# Patient Record
Sex: FEMALE | Race: WHITE | NOT HISPANIC OR LATINO | Employment: UNEMPLOYED | URBAN - METROPOLITAN AREA
[De-identification: names, ages, dates, MRNs, and addresses within clinical notes are randomized per-mention and may not be internally consistent; named-entity substitution may affect disease eponyms.]

---

## 2022-06-13 ENCOUNTER — HOSPITAL ENCOUNTER (EMERGENCY)
Facility: HOSPITAL | Age: 32
Discharge: HOME | End: 2022-06-13
Attending: EMERGENCY MEDICINE
Payer: MEDICAID

## 2022-06-13 VITALS
TEMPERATURE: 99 F | HEART RATE: 92 BPM | OXYGEN SATURATION: 98 % | SYSTOLIC BLOOD PRESSURE: 126 MMHG | WEIGHT: 120 LBS | DIASTOLIC BLOOD PRESSURE: 87 MMHG | RESPIRATION RATE: 20 BRPM | BODY MASS INDEX: 22.08 KG/M2 | HEIGHT: 62 IN

## 2022-06-13 DIAGNOSIS — B34.9 ACUTE VIRAL SYNDROME: Primary | ICD-10-CM

## 2022-06-13 LAB
FLUAV AG UPPER RESP QL IA.RAPID: NOT DETECTED
FLUBV AG UPPER RESP QL IA.RAPID: NOT DETECTED
SARS-COV-2 RNA RESP QL NAA+PROBE: NOT DETECTED

## 2022-06-13 PROCEDURE — 87636 SARSCOV2 & INF A&B AMP PRB: CPT | Performed by: EMERGENCY MEDICINE

## 2022-06-13 PROCEDURE — 99282 EMERGENCY DEPT VISIT SF MDM: CPT | Mod: 25

## 2022-06-13 NOTE — Clinical Note
"Aggie Arevalo" Glenroy was seen and treated in our emergency department on 6/13/2022.  She may return to work on 06/15/2022.       If you have any questions or concerns, please don't hesitate to call.      Pratik Porter RN RN    "

## 2022-06-13 NOTE — ED NOTES
Accompanied alone to ED, c/o body aches w/ fever and slight cough x 1 days, motrin taken pta, reports co workers are flu +

## 2022-06-13 NOTE — LETTER
"  [facility] - Emergency Dept  [address]  [facility] [address]  Phone: [phone]   June 13, 2022    Patient: Aggie Tim (Tiffani)   YOB: 1990   Date of Visit: 6/13/2022   Patient ID [id number]       To Whom It May Concern:    Aggie Tim (Tiffani) was seen and treated in our emergency department on 6/13/2022. She {Return to school/sport/work:30110}.      Sincerely,          ,       "

## 2022-06-13 NOTE — ED PROVIDER NOTES
Encounter Date: 6/13/2022       History     Chief Complaint   Patient presents with    bodyaches     Pt c/o bodyaches, fever and chills onset last night.     The history is provided by the patient. No  was used.   Fever  Primary symptoms of the febrile illness include fever, headaches, cough, myalgias and arthralgias. Primary symptoms do not include shortness of breath, nausea, dysuria or rash. This is a new problem.   The maximum temperature recorded prior to her arrival was 100 to 100.9 F.   The headache began today. Headache is a new problem. Location/region(s) of the headache: bilateral. The headache is not associated with weakness.   The cough began yesterday. The cough is non-productive.   Myalgias began yesterday. The myalgias have been unchanged since their onset. The myalgias are not associated with weakness.   States co-worker tested + for influenza    Review of patient's allergies indicates:   Allergen Reactions    Morphine      Other reaction(s): itching    Penicillins      Other reaction(s): unknown     No past medical history on file.  No past surgical history on file.  No family history on file.     Review of Systems   Constitutional: Positive for fever.   HENT: Negative for sore throat.    Respiratory: Positive for cough. Negative for shortness of breath.    Cardiovascular: Negative for chest pain.   Gastrointestinal: Negative for nausea.   Genitourinary: Negative for dysuria.   Musculoskeletal: Positive for arthralgias and myalgias. Negative for back pain.   Skin: Negative for rash.   Neurological: Positive for headaches. Negative for weakness.   Hematological: Does not bruise/bleed easily.   All other systems reviewed and are negative.      Physical Exam     Initial Vitals [06/13/22 0805]   BP Pulse Resp Temp SpO2   126/87 92 20 98.8 °F (37.1 °C) 98 %      MAP       --         Physical Exam    Nursing note and vitals reviewed.  Constitutional: She appears well-developed and  well-nourished.   HENT:   Head: Normocephalic and atraumatic.   Right Ear: External ear normal.   Left Ear: External ear normal.   Eyes: Conjunctivae and EOM are normal. Pupils are equal, round, and reactive to light.   Neck: Neck supple.   Normal range of motion.  Cardiovascular: Normal rate, regular rhythm, normal heart sounds and intact distal pulses.   Pulmonary/Chest: Breath sounds normal.   Abdominal: Abdomen is soft. Bowel sounds are normal.   Musculoskeletal:         General: Normal range of motion.      Cervical back: Normal range of motion and neck supple.     Neurological: She is alert and oriented to person, place, and time. GCS score is 15. GCS eye subscore is 4. GCS verbal subscore is 5. GCS motor subscore is 6.   Skin: Skin is warm and dry. Capillary refill takes less than 2 seconds.   Psychiatric: She has a normal mood and affect. Her behavior is normal. Judgment and thought content normal.         ED Course   Procedures  Labs Reviewed   COVID/FLU A&B PCR - Normal          Imaging Results    None          Medications - No data to display                       Clinical Impression:   Final diagnoses:  [B34.9] Acute viral syndrome (Primary)          ED Disposition Condition    Discharge Stable        ED Prescriptions     None        Follow-up Information     Follow up With Specialties Details Why Contact Info    Follow up with your primary MD in 3-5 days if not improved.  Return to ED for worsening symptoms.               Axel Ogden MD  06/13/22 7810

## 2022-09-25 ENCOUNTER — HOSPITAL ENCOUNTER (EMERGENCY)
Facility: HOSPITAL | Age: 32
Discharge: HOME | End: 2022-09-25
Attending: FAMILY MEDICINE
Payer: MEDICAID

## 2022-09-25 VITALS
OXYGEN SATURATION: 99 % | BODY MASS INDEX: 22.08 KG/M2 | SYSTOLIC BLOOD PRESSURE: 125 MMHG | TEMPERATURE: 98 F | RESPIRATION RATE: 18 BRPM | DIASTOLIC BLOOD PRESSURE: 98 MMHG | HEIGHT: 62 IN | HEART RATE: 94 BPM | WEIGHT: 120 LBS

## 2022-09-25 DIAGNOSIS — J06.9 VIRAL URI WITH COUGH: Primary | ICD-10-CM

## 2022-09-25 LAB
FLUAV AG UPPER RESP QL IA.RAPID: NOT DETECTED
FLUBV AG UPPER RESP QL IA.RAPID: NOT DETECTED
SARS-COV-2 RDRP RESP QL NAA+PROBE: NEGATIVE
SARS-COV-2 RNA RESP QL NAA+PROBE: NOT DETECTED
STREP A PCR (OHS): NOT DETECTED

## 2022-09-25 PROCEDURE — 87635 SARS-COV-2 COVID-19 AMP PRB: CPT | Performed by: INTERNAL MEDICINE

## 2022-09-25 PROCEDURE — 87651 STREP A DNA AMP PROBE: CPT | Performed by: INTERNAL MEDICINE

## 2022-09-25 PROCEDURE — 87631 RESP VIRUS 3-5 TARGETS: CPT | Performed by: INTERNAL MEDICINE

## 2022-09-25 PROCEDURE — 25000003 PHARM REV CODE 250: Performed by: PHYSICIAN ASSISTANT

## 2022-09-25 PROCEDURE — 87636 SARSCOV2 & INF A&B AMP PRB: CPT | Performed by: PHYSICIAN ASSISTANT

## 2022-09-25 PROCEDURE — 99284 EMERGENCY DEPT VISIT MOD MDM: CPT | Mod: 25

## 2022-09-25 RX ORDER — IBUPROFEN 600 MG/1
600 TABLET ORAL
Status: DISCONTINUED | OUTPATIENT
Start: 2022-09-25 | End: 2022-09-25

## 2022-09-25 RX ORDER — BENZONATATE 100 MG/1
100 CAPSULE ORAL 3 TIMES DAILY PRN
Qty: 21 CAPSULE | Refills: 0 | Status: SHIPPED | OUTPATIENT
Start: 2022-09-25 | End: 2022-10-02

## 2022-09-25 RX ORDER — FLUTICASONE PROPIONATE 50 MCG
1 SPRAY, SUSPENSION (ML) NASAL 2 TIMES DAILY PRN
Qty: 15 G | Refills: 0 | Status: SHIPPED | OUTPATIENT
Start: 2022-09-25

## 2022-09-25 RX ORDER — CETIRIZINE HYDROCHLORIDE 10 MG/1
10 TABLET ORAL DAILY
Qty: 30 TABLET | Refills: 0 | Status: SHIPPED | OUTPATIENT
Start: 2022-09-25 | End: 2022-10-25

## 2022-09-25 RX ORDER — IBUPROFEN 600 MG/1
600 TABLET ORAL
Status: COMPLETED | OUTPATIENT
Start: 2022-09-25 | End: 2022-09-25

## 2022-09-25 RX ADMIN — IBUPROFEN 600 MG: 600 TABLET ORAL at 07:09

## 2022-09-25 NOTE — Clinical Note
"Aggie Arevalo" Glenroy was seen and treated in our emergency department on 9/25/2022.  She may return to work on 09/28/2022.       If you have any questions or concerns, please don't hesitate to call.      JORGE Engel"

## 2022-09-26 NOTE — ED PROVIDER NOTES
Encounter Date: 9/25/2022       History     Chief Complaint   Patient presents with    Sore Throat     Pt to er c/o fever and sorethroat along with bodyaches onset last night.     32-year-old female presents to ED for evaluation of cough, congestion, and body aches over the last 2 days.  States she has been having fever at home.  Alternating Tylenol and ibuprofen with minimal relief.  Patient complains of sore throat.  Denies any trouble swallowing.  States that her children were recently sick with viral syndrome.    The history is provided by the patient. No  was used.   Sore Throat   This is a new problem. The sore throat symptoms include sore throat and fever.The current episode started two days ago. Associated symptoms include congestion, coughing and neck pain. Pertinent negatives include no abdominal pain, diarrhea, ear discharge, ear pain, headaches, shortness of breath, stridor, swollen glands or vomiting. The treatment provided mild relief.   Review of patient's allergies indicates:   Allergen Reactions    Morphine      Other reaction(s): itching    Penicillins      Other reaction(s): unknown     History reviewed. No pertinent past medical history.  Past Surgical History:   Procedure Laterality Date    c section x 4      TONSILLECTOMY       No family history on file.     Review of Systems   Constitutional:  Positive for chills, fatigue and fever.   HENT:  Positive for congestion and sore throat. Negative for ear discharge, ear pain and rhinorrhea.    Respiratory:  Positive for cough. Negative for shortness of breath, wheezing and stridor.    Cardiovascular:  Negative for chest pain.   Gastrointestinal:  Negative for abdominal pain, diarrhea, nausea and vomiting.   Musculoskeletal:  Positive for myalgias and neck pain.   Neurological:  Negative for headaches.   All other systems reviewed and are negative.    Physical Exam     Initial Vitals [09/25/22 1808]   BP Pulse Resp Temp SpO2   (!)  125/98 94 18 97.5 °F (36.4 °C) 99 %      MAP       --         Physical Exam    Vitals reviewed.  Constitutional: She appears well-developed.   HENT:   Head: Normocephalic and atraumatic.   Right Ear: Tympanic membrane and external ear normal.   Left Ear: Tympanic membrane and external ear normal.   Mouth/Throat: Uvula is midline, oropharynx is clear and moist and mucous membranes are normal. No trismus in the jaw. No uvula swelling. No oropharyngeal exudate, posterior oropharyngeal edema or posterior oropharyngeal erythema.   Eyes: Conjunctivae are normal. Pupils are equal, round, and reactive to light.   Neck: Neck supple.   Normal range of motion.  Cardiovascular:  Normal rate, regular rhythm and normal heart sounds.           Pulmonary/Chest: Breath sounds normal. She has no wheezes. She has no rhonchi. She has no rales.   Abdominal: Abdomen is soft. Bowel sounds are normal. There is no abdominal tenderness.   Musculoskeletal:         General: Normal range of motion.      Cervical back: Normal range of motion and neck supple. No rigidity.     Lymphadenopathy:     She has no cervical adenopathy.   Neurological: She is alert and oriented to person, place, and time. She has normal strength. No cranial nerve deficit or sensory deficit.   Skin: Skin is warm and dry.   Psychiatric: She has a normal mood and affect.       ED Course   Procedures  Labs Reviewed   SARS-COV-2 RNA AMPLIFICATION, QUAL - Normal   STREP GROUP A BY PCR - Normal   COVID/FLU A&B PCR - Normal          Imaging Results    None          Medications   ibuprofen tablet 600 mg (600 mg Oral Given 9/25/22 1904)                              Clinical Impression:   Final diagnoses:  [J06.9] Viral URI with cough (Primary)      ED Disposition Condition    Discharge Stable          ED Prescriptions       Medication Sig Dispense Start Date End Date Auth. Provider    benzonatate (TESSALON) 100 MG capsule Take 1 capsule (100 mg total) by mouth 3 (three) times daily  as needed for Cough. 21 capsule 9/25/2022 10/2/2022 JORGE Engel    cetirizine (ZYRTEC) 10 MG tablet Take 1 tablet (10 mg total) by mouth once daily. 30 tablet 9/25/2022 10/25/2022 JORGE Engel    fluticasone propionate (FLONASE) 50 mcg/actuation nasal spray 1 spray (50 mcg total) by Each Nostril route 2 (two) times daily as needed for Rhinitis. 15 g 9/25/2022 -- JORGE Engel          Follow-up Information       Follow up With Specialties Details Why Contact Info    PCP  In 1 week As needed              JORGE Engel  09/25/22 5166

## 2022-09-26 NOTE — DISCHARGE INSTRUCTIONS
Hydrate with plenty of water. Tylenol and ibuprofen in rotation for fever/aches. Use allergy medication daily. May use Tessalon perles for cough.  Stay home as you are contagious.

## 2024-02-07 ENCOUNTER — HOSPITAL ENCOUNTER (EMERGENCY)
Facility: HOSPITAL | Age: 34
Discharge: HOME | End: 2024-02-07
Attending: EMERGENCY MEDICINE | Admitting: EMERGENCY MEDICINE

## 2024-02-07 VITALS
DIASTOLIC BLOOD PRESSURE: 94 MMHG | HEART RATE: 103 BPM | OXYGEN SATURATION: 99 % | RESPIRATION RATE: 13 BRPM | WEIGHT: 115 LBS | BODY MASS INDEX: 21.71 KG/M2 | SYSTOLIC BLOOD PRESSURE: 132 MMHG | TEMPERATURE: 98 F | HEIGHT: 61 IN

## 2024-02-07 DIAGNOSIS — T50.901A ACCIDENTAL OVERDOSE, INITIAL ENCOUNTER: Primary | ICD-10-CM

## 2024-02-07 PROCEDURE — 63600175 PHARM REV CODE 636 W HCPCS: Performed by: EMERGENCY MEDICINE

## 2024-02-07 PROCEDURE — 96374 THER/PROPH/DIAG INJ IV PUSH: CPT

## 2024-02-07 PROCEDURE — 99284 EMERGENCY DEPT VISIT MOD MDM: CPT

## 2024-02-07 RX ORDER — ONDANSETRON HYDROCHLORIDE 2 MG/ML
4 INJECTION, SOLUTION INTRAVENOUS
Status: COMPLETED | OUTPATIENT
Start: 2024-02-07 | End: 2024-02-07

## 2024-02-07 RX ORDER — NALOXONE HYDROCHLORIDE 1 MG/ML
INJECTION INTRAMUSCULAR; INTRAVENOUS; SUBCUTANEOUS
Qty: 2 ML | Refills: 11 | Status: SHIPPED | OUTPATIENT
Start: 2024-02-07

## 2024-02-07 RX ADMIN — ONDANSETRON 4 MG: 2 INJECTION INTRAMUSCULAR; INTRAVENOUS at 10:02

## 2024-02-07 NOTE — ED PROVIDER NOTES
Encounter Date: 2/7/2024    SCRIBE #1 NOTE: I, Ty Atwood, am scribing for, and in the presence of,  Derrick Rothman MD. I have scribed the following portions of the note - Other sections scribed: HPI, ROS, PE.       History     Chief Complaint   Patient presents with    Drug Overdose     EMS reports OD on heroin. Initially apneic & cyanotic when fire arrived. Received Narcan 4 mg. Initial GCS 3 - currently GCS 15.     A 32 y/o female with a history of drug abuse presents to [facility] ED via EMS after being found unresponsive. EMS reports administering 4mg narcan and that the patient is now GCS 15. Patient states that [place], that they had a tray full of drugs, and that she accidentally snorted heroin instead of methamphetamines. Patient denies the use of opioids, drug cessation, and drug counseling.       The history is provided by the patient and medical records.     Review of patient's allergies indicates:   Allergen Reactions    Morphine      Other reaction(s): itching    Penicillins      Other reaction(s): unknown     History reviewed. No pertinent past medical history.  Past Surgical History:   Procedure Laterality Date    c section x 4      TONSILLECTOMY       History reviewed. No pertinent family history.     Review of Systems   Constitutional:  Negative for fatigue.   Respiratory:  Negative for shortness of breath.    Psychiatric/Behavioral:          Non drowsy        Physical Exam     Initial Vitals [02/07/24 0820]   BP Pulse Resp Temp SpO2   (!) 166/100 107 18 98.1 °F (36.7 °C) 95 %      MAP       --         Physical Exam    Nursing note and vitals reviewed.  Constitutional: She appears well-developed. No distress.   HENT:   Head: Normocephalic and atraumatic.   Mouth/Throat: Oropharynx is clear and moist.   Eyes: Conjunctivae and EOM are normal. Pupils are equal, round, and reactive to light.   Neck: Neck supple.   Cardiovascular:  Normal rate and regular rhythm.            No murmur heard.  Pulmonary/Chest: Breath sounds normal. No respiratory distress. She exhibits no tenderness.   Abdominal: Abdomen is soft. Bowel sounds are normal. She exhibits no distension. There is no abdominal tenderness.   Musculoskeletal:         General: Normal range of motion.      Cervical back: Neck supple.      Lumbar back: Normal. Normal range of motion.     Neurological: She is alert and oriented to person, place, and time. She has normal strength. No cranial nerve deficit or sensory deficit.   Psychiatric: Judgment normal. Her mood appears anxious.         ED Course   Procedures  Labs Reviewed - No data to display       Imaging Results    None          Medications - No data to display  Medical Decision Making  Differential diagnosis includes opioid overdose.     Risk  Prescription drug management.            Scribe Attestation:   Scribe #1: I performed the above scribed service and the documentation accurately describes the services I performed. I attest to the accuracy of the note.    Attending Attestation:           Physician Attestation for Scribe:  Physician Attestation Statement for Scribe #1: I, Derrick Rothman MD, reviewed documentation, as scribed by Ty Atwood in my presence, and it is both accurate and complete.             ED Course as of 02/07/24 1004   Wed Feb 07, 2024   1003 Pt doing will, declined beacon and suboxone - not typically an opiate user - took by accident.  Pt not req o2, ok for dc [NL]      ED Course User Index  [NL] Derrick Rothman MD                           Clinical Impression:  Final diagnoses:  [T50.901A] Accidental overdose, initial encounter (Primary)          ED Disposition Condition    Discharge Stable          ED Prescriptions       Medication Sig Dispense Start Date End Date Auth. Provider    naloxone (NARCAN) 1 mg/mL injection 2 mg (1 mg per nostril) by Nasal route as needed for opioid overdose; may repeat in 3 to 5 minutes if not  effective. Call 911 2 mL 2/7/2024 -- Derrick Rothman MD          Follow-up Information       Follow up With Specialties Details Why Contact Info    PCP  Call in 1 day  follow up with PCP ni 1-2 days             Derrick Rothman MD  02/07/24 1004

## 2024-02-07 NOTE — PROGRESS NOTES
Met with patient to provide support and discuss resources. Pt stated that she is not interested in [place] [facility] program or substance abuse treatment, reporting ability to quit on her own. Pt will be discharging to a friends home where she said she will be safe and working on getting a job. Pt has 4 kids but they are in her mothers custody and she has minimal contact/ no unsupervised contact with them. Discussed [facility] for assistance with housing and she declined. Provided mental health resources and information on where to obtain free Narcan.